# Patient Record
Sex: FEMALE | Race: BLACK OR AFRICAN AMERICAN | NOT HISPANIC OR LATINO | Employment: FULL TIME | URBAN - METROPOLITAN AREA
[De-identification: names, ages, dates, MRNs, and addresses within clinical notes are randomized per-mention and may not be internally consistent; named-entity substitution may affect disease eponyms.]

---

## 2023-07-01 ENCOUNTER — OFFICE VISIT (OUTPATIENT)
Dept: URGENT CARE | Facility: CLINIC | Age: 33
End: 2023-07-01
Payer: COMMERCIAL

## 2023-07-01 VITALS
SYSTOLIC BLOOD PRESSURE: 138 MMHG | HEART RATE: 84 BPM | RESPIRATION RATE: 20 BRPM | HEIGHT: 63 IN | TEMPERATURE: 98 F | OXYGEN SATURATION: 98 % | WEIGHT: 228 LBS | DIASTOLIC BLOOD PRESSURE: 80 MMHG | BODY MASS INDEX: 40.4 KG/M2

## 2023-07-01 DIAGNOSIS — M62.830 BACK MUSCLE SPASM: ICD-10-CM

## 2023-07-01 DIAGNOSIS — M54.50 ACUTE BILATERAL LOW BACK PAIN WITHOUT SCIATICA: Primary | ICD-10-CM

## 2023-07-01 LAB
B-HCG UR QL: NEGATIVE
BILIRUB UR QL STRIP: NEGATIVE
CTP QC/QA: YES
GLUCOSE UR QL STRIP: NEGATIVE
KETONES UR QL STRIP: NEGATIVE
LEUKOCYTE ESTERASE UR QL STRIP: NEGATIVE
PH, POC UA: 5
POC BLOOD, URINE: NEGATIVE
POC NITRATES, URINE: NEGATIVE
PROT UR QL STRIP: NEGATIVE
SP GR UR STRIP: 1.02 (ref 1–1.03)
UROBILINOGEN UR STRIP-ACNC: NORMAL (ref 0.1–1.1)

## 2023-07-01 PROCEDURE — 96372 THER/PROPH/DIAG INJ SC/IM: CPT | Mod: S$GLB,,,

## 2023-07-01 PROCEDURE — 81025 URINE PREGNANCY TEST: CPT | Mod: S$GLB,,,

## 2023-07-01 PROCEDURE — 99203 PR OFFICE/OUTPT VISIT, NEW, LEVL III, 30-44 MIN: ICD-10-PCS | Mod: 25,S$GLB,,

## 2023-07-01 PROCEDURE — 99203 OFFICE O/P NEW LOW 30 MIN: CPT | Mod: 25,S$GLB,,

## 2023-07-01 PROCEDURE — 81003 URINALYSIS AUTO W/O SCOPE: CPT | Mod: QW,S$GLB,,

## 2023-07-01 PROCEDURE — 96372 PR INJECTION,THERAP/PROPH/DIAG2ST, IM OR SUBCUT: ICD-10-PCS | Mod: S$GLB,,,

## 2023-07-01 PROCEDURE — 81025 POCT URINE PREGNANCY: ICD-10-PCS | Mod: S$GLB,,,

## 2023-07-01 PROCEDURE — 81003 POCT URINALYSIS, DIPSTICK, AUTOMATED, W/O SCOPE: ICD-10-PCS | Mod: QW,S$GLB,,

## 2023-07-01 RX ORDER — DEXAMETHASONE SODIUM PHOSPHATE 100 MG/10ML
10 INJECTION INTRAMUSCULAR; INTRAVENOUS
Status: COMPLETED | OUTPATIENT
Start: 2023-07-01 | End: 2023-07-01

## 2023-07-01 RX ORDER — KETOROLAC TROMETHAMINE 10 MG/1
10 TABLET, FILM COATED ORAL EVERY 6 HOURS PRN
Qty: 20 TABLET | Refills: 0 | Status: SHIPPED | OUTPATIENT
Start: 2023-07-01 | End: 2023-07-06

## 2023-07-01 RX ORDER — PREDNISONE 10 MG/1
TABLET ORAL
Qty: 8 TABLET | Refills: 0 | Status: SHIPPED | OUTPATIENT
Start: 2023-07-01

## 2023-07-01 RX ORDER — KETOROLAC TROMETHAMINE 30 MG/ML
60 INJECTION, SOLUTION INTRAMUSCULAR; INTRAVENOUS
Status: COMPLETED | OUTPATIENT
Start: 2023-07-01 | End: 2023-07-01

## 2023-07-01 RX ADMIN — DEXAMETHASONE SODIUM PHOSPHATE 10 MG: 100 INJECTION INTRAMUSCULAR; INTRAVENOUS at 11:07

## 2023-07-01 RX ADMIN — KETOROLAC TROMETHAMINE 60 MG: 30 INJECTION, SOLUTION INTRAMUSCULAR; INTRAVENOUS at 12:07

## 2023-07-01 NOTE — LETTER
July 1, 2023      [place] [facility]  [facility], [address]  [place] [address]  Phone: [phone]  Fax: [phone]       Patient: Myra Marie   YOB: 1990  Date of Visit: 07/01/2023    To Whom It May Concern:    Stefano Marie  was at [facility] on 07/01/2023. The patient may return to work/school on 07/02/2023 with no restrictions. If you have any questions or concerns, or if I can be of further assistance, please do not hesitate to contact me.    Sincerely,    Lynda Patel, NP

## 2023-07-01 NOTE — PROGRESS NOTES
"Subjective:       Patient ID: Myra Marie is a 33 y.o. female.    Vitals:  height is 5' 3" (1.6 m) and weight is 103.4 kg (228 lb). Her oral temperature is 98.4 °F (36.9 °C). Her blood pressure is 138/80 and her pulse is 84. Her respiration is 20 and oxygen saturation is 98%.     Chief Complaint: Back Pain (Low back pain x 2 days worse on the left.  Denies any urinary urgency, frequency or burning with urination.  Denies fever.)    This is a 33 y.o. female who presents today with a chief complaint of Low back pain x 2 days worse on the left.  Denies any urinary urgency, frequency or burning with urination.  Denies fever.  Patient presents with:  Back Pain: Low back pain x 2 days worse on the left.  Denies any urinary urgency, frequency or burning with urination.  Denies fever.  Patient states that the pain is in her left lower back area patient states that the pain radiates across her entire lower back patient states that the pain is a sharp pain. Patient states that the pain gets worse with certain movements. Patient states that she does stay on her feet a lot at work. Patient denies any trauma or known cause.          Back Pain  This is a new problem. The current episode started yesterday. The problem has been gradually worsening since onset. The quality of the pain is described as aching. The pain does not radiate. The pain is at a severity of 8/10. The pain is severe. The symptoms are aggravated by twisting and sitting (walking). She has tried nothing for the symptoms. The treatment provided no relief.     Constitution: Negative.   HENT: Negative.     Neck: neck negative.   Cardiovascular: Negative.    Eyes: Negative.    Respiratory: Negative.     Gastrointestinal: Negative.    Endocrine: negative.   Genitourinary:  Positive for flank pain.   Musculoskeletal:  Positive for back pain.   Skin: Negative.    Allergic/Immunologic: Negative.    Neurological: Negative.    Hematologic/Lymphatic: Negative.  "   Psychiatric/Behavioral: Negative.           Objective:      Physical Exam   Constitutional: She is oriented to person, place, and time.   HENT:   Head: Normocephalic and atraumatic.   Eyes: Conjunctivae are normal. Pupils are equal, round, and reactive to light. Extraocular movement intact   Cardiovascular: Normal rate and normal pulses.   Pulmonary/Chest: Effort normal.   Abdominal: Normal appearance.   Musculoskeletal:         General: Tenderness present.      Thoracic back: Normal.      Lumbar back: She exhibits decreased range of motion, tenderness and spasm.   Neurological: no focal deficit. She is alert, oriented to person, place, and time and at baseline.   Skin: Skin is warm. Capillary refill takes less than 2 seconds.   Psychiatric: Her behavior is normal. Mood, judgment and thought content normal.   Nursing note and vitals reviewed.      Past medical history and current medications reviewed.       Assessment:     Results for orders placed or performed in visit on 07/01/23   POCT urine pregnancy   Result Value Ref Range    POC Preg Test, Ur Negative Negative     Acceptable Yes    POCT Urinalysis, Dipstick, Automated, W/O Scope   Result Value Ref Range    POC Blood, Urine Negative Negative    POC Bilirubin, Urine Negative Negative    POC Urobilinogen, Urine Normal 0.1 - 1.1    POC Ketones, Urine Negative Negative    POC Protein, Urine Negative Negative    POC Nitrates, Urine Negative Negative    POC Glucose, Urine Negative Negative    pH, UA 5.0     POC Specific Gravity, Urine 1.025 1.003 - 1.029    POC Leukocytes, Urine Negative Negative             1. Acute bilateral low back pain without sciatica    2. Back muscle spasm              Plan:         Acute bilateral low back pain without sciatica  -     POCT urine pregnancy  -     POCT Urinalysis, Dipstick, Automated, W/O Scope  -     predniSONE (DELTASONE) 10 MG tablet; Take 20 mg on day one then take 10 mg on day 2-7  Dispense: 8 tablet;  Refill: 0  -     ketorolac (TORADOL) 10 mg tablet; Take 1 tablet (10 mg total) by mouth every 6 (six) hours as needed for Pain.  Dispense: 20 tablet; Refill: 0  -     ketorolac injection 60 mg  -     dexAMETHasone injection 10 mg    Back muscle spasm             Patient Instructions   Patient educated to start prednisone tomorrow  Do not take Ketorolac until 6 hours due to receiving shot in office.       You must understand that you've received an Urgent Care treatment only and that you may be released before all your medical problems are known or treated. You, the patient, will arrange for follow up care as instructed.  Follow up with your PCP or specialty clinic as directed in the next 1-2 weeks if not improved or as needed.  You can call [phone] to schedule an appointment with the appropriate provider.  If your condition worsens we recommend that you receive another evaluation at the emergency room immediately or contact your primary medical clinics after hours call service to discuss your concerns.  Please return here or go to the Emergency Department for any concerns or worsening of condition.  Please if you smoke please consider quitting. [facility] Smoke cessation hotline number is [phone], available at this number is free counseling and medications to live a healthier life!         If you were prescribed a narcotic or controlled medication, do not drive or operate heavy equipment or machinery while taking these medications.

## 2023-07-01 NOTE — PATIENT INSTRUCTIONS
Patient educated to start prednisone tomorrow  Do not take Ketorolac until 6 hours due to receiving shot in office.       You must understand that you've received an Urgent Care treatment only and that you may be released before all your medical problems are known or treated. You, the patient, will arrange for follow up care as instructed.  Follow up with your PCP or specialty clinic as directed in the next 1-2 weeks if not improved or as needed.  You can call [phone] to schedule an appointment with the appropriate provider.  If your condition worsens we recommend that you receive another evaluation at the emergency room immediately or contact your primary medical clinics after hours call service to discuss your concerns.  Please return here or go to the Emergency Department for any concerns or worsening of condition.  Please if you smoke please consider quitting. [facility] Smoke cessation hotline number is [phone], available at this number is free counseling and medications to live a healthier life!         If you were prescribed a narcotic or controlled medication, do not drive or operate heavy equipment or machinery while taking these medications.

## 2023-07-05 ENCOUNTER — HOSPITAL ENCOUNTER (EMERGENCY)
Facility: HOSPITAL | Age: 33
Discharge: HOME | End: 2023-07-05
Attending: EMERGENCY MEDICINE
Payer: COMMERCIAL

## 2023-07-05 VITALS
WEIGHT: 228 LBS | HEIGHT: 63 IN | BODY MASS INDEX: 40.4 KG/M2 | OXYGEN SATURATION: 99 % | DIASTOLIC BLOOD PRESSURE: 89 MMHG | SYSTOLIC BLOOD PRESSURE: 156 MMHG | TEMPERATURE: 98 F | RESPIRATION RATE: 18 BRPM | HEART RATE: 74 BPM

## 2023-07-05 DIAGNOSIS — M62.830 BACK MUSCLE SPASM: Primary | ICD-10-CM

## 2023-07-05 LAB
ALBUMIN SERPL BCP-MCNC: 3.4 G/DL (ref 3.5–5.2)
ALP SERPL-CCNC: 51 U/L (ref 55–135)
ALT SERPL W/O P-5'-P-CCNC: 13 U/L (ref 10–44)
ANION GAP SERPL CALC-SCNC: 11 MMOL/L (ref 8–16)
AST SERPL-CCNC: 12 U/L (ref 10–40)
B-HCG UR QL: NEGATIVE
BASOPHILS # BLD AUTO: 0.04 K/UL (ref 0–0.2)
BASOPHILS NFR BLD: 0.3 % (ref 0–1.9)
BILIRUB SERPL-MCNC: 0.2 MG/DL (ref 0.1–1)
BILIRUB UR QL STRIP: NEGATIVE
BUN SERPL-MCNC: 14 MG/DL (ref 6–20)
CALCIUM SERPL-MCNC: 8.9 MG/DL (ref 8.7–10.5)
CHLORIDE SERPL-SCNC: 104 MMOL/L (ref 95–110)
CLARITY UR: CLEAR
CO2 SERPL-SCNC: 23 MMOL/L (ref 23–29)
COLOR UR: YELLOW
CREAT SERPL-MCNC: 0.9 MG/DL (ref 0.5–1.4)
CRP SERPL-MCNC: 18.9 MG/L (ref 0–8.2)
DIFFERENTIAL METHOD: ABNORMAL
EOSINOPHIL # BLD AUTO: 0.1 K/UL (ref 0–0.5)
EOSINOPHIL NFR BLD: 0.6 % (ref 0–8)
ERYTHROCYTE [DISTWIDTH] IN BLOOD BY AUTOMATED COUNT: 17.7 % (ref 11.5–14.5)
EST. GFR  (NO RACE VARIABLE): >60 ML/MIN/1.73 M^2
GLUCOSE SERPL-MCNC: 82 MG/DL (ref 70–110)
GLUCOSE UR QL STRIP: NEGATIVE
HCT VFR BLD AUTO: 29.6 % (ref 37–48.5)
HGB BLD-MCNC: 9.4 G/DL (ref 12–16)
HGB UR QL STRIP: NEGATIVE
IMM GRANULOCYTES # BLD AUTO: 0.05 K/UL (ref 0–0.04)
IMM GRANULOCYTES NFR BLD AUTO: 0.4 % (ref 0–0.5)
KETONES UR QL STRIP: NEGATIVE
LEUKOCYTE ESTERASE UR QL STRIP: NEGATIVE
LYMPHOCYTES # BLD AUTO: 3.9 K/UL (ref 1–4.8)
LYMPHOCYTES NFR BLD: 33 % (ref 18–48)
MCH RBC QN AUTO: 23.4 PG (ref 27–31)
MCHC RBC AUTO-ENTMCNC: 31.8 G/DL (ref 32–36)
MCV RBC AUTO: 74 FL (ref 82–98)
MONOCYTES # BLD AUTO: 0.9 K/UL (ref 0.3–1)
MONOCYTES NFR BLD: 7.3 % (ref 4–15)
NEUTROPHILS # BLD AUTO: 7 K/UL (ref 1.8–7.7)
NEUTROPHILS NFR BLD: 58.4 % (ref 38–73)
NITRITE UR QL STRIP: NEGATIVE
NRBC BLD-RTO: 0 /100 WBC
PH UR STRIP: 6 [PH] (ref 5–8)
PLATELET # BLD AUTO: 345 K/UL (ref 150–450)
PMV BLD AUTO: 9.1 FL (ref 9.2–12.9)
POTASSIUM SERPL-SCNC: 3.6 MMOL/L (ref 3.5–5.1)
PROT SERPL-MCNC: 6.8 G/DL (ref 6–8.4)
PROT UR QL STRIP: NEGATIVE
RBC # BLD AUTO: 4.01 M/UL (ref 4–5.4)
SODIUM SERPL-SCNC: 138 MMOL/L (ref 136–145)
SP GR UR STRIP: 1.02 (ref 1–1.03)
URN SPEC COLLECT METH UR: NORMAL
UROBILINOGEN UR STRIP-ACNC: NEGATIVE EU/DL
WBC # BLD AUTO: 11.95 K/UL (ref 3.9–12.7)

## 2023-07-05 PROCEDURE — 96360 HYDRATION IV INFUSION INIT: CPT

## 2023-07-05 PROCEDURE — 81003 URINALYSIS AUTO W/O SCOPE: CPT | Performed by: EMERGENCY MEDICINE

## 2023-07-05 PROCEDURE — 99285 EMERGENCY DEPT VISIT HI MDM: CPT | Mod: 25

## 2023-07-05 PROCEDURE — 63600175 PHARM REV CODE 636 W HCPCS: Performed by: EMERGENCY MEDICINE

## 2023-07-05 PROCEDURE — 86140 C-REACTIVE PROTEIN: CPT | Performed by: EMERGENCY MEDICINE

## 2023-07-05 PROCEDURE — 74176 CT ABD & PELVIS W/O CONTRAST: CPT | Mod: TC

## 2023-07-05 PROCEDURE — 80053 COMPREHEN METABOLIC PANEL: CPT | Performed by: EMERGENCY MEDICINE

## 2023-07-05 PROCEDURE — 74176 CT ABDOMEN PELVIS WITHOUT CONTRAST: ICD-10-PCS | Mod: 26,,, | Performed by: RADIOLOGY

## 2023-07-05 PROCEDURE — 96372 THER/PROPH/DIAG INJ SC/IM: CPT | Mod: 59 | Performed by: EMERGENCY MEDICINE

## 2023-07-05 PROCEDURE — 25000003 PHARM REV CODE 250: Performed by: EMERGENCY MEDICINE

## 2023-07-05 PROCEDURE — 74176 CT ABD & PELVIS W/O CONTRAST: CPT | Mod: 26,,, | Performed by: RADIOLOGY

## 2023-07-05 PROCEDURE — 81025 URINE PREGNANCY TEST: CPT | Performed by: EMERGENCY MEDICINE

## 2023-07-05 PROCEDURE — 85025 COMPLETE CBC W/AUTO DIFF WBC: CPT | Performed by: EMERGENCY MEDICINE

## 2023-07-05 RX ORDER — HYDROCODONE BITARTRATE AND ACETAMINOPHEN 10; 325 MG/1; MG/1
1 TABLET ORAL EVERY 6 HOURS PRN
Qty: 20 TABLET | Refills: 0 | Status: SHIPPED | OUTPATIENT
Start: 2023-07-05

## 2023-07-05 RX ORDER — LIDOCAINE 50 MG/G
1 PATCH TOPICAL DAILY PRN
Qty: 10 PATCH | Refills: 0 | Status: SHIPPED | OUTPATIENT
Start: 2023-07-05

## 2023-07-05 RX ORDER — ONDANSETRON 4 MG/1
4 TABLET, ORALLY DISINTEGRATING ORAL
Status: COMPLETED | OUTPATIENT
Start: 2023-07-05 | End: 2023-07-05

## 2023-07-05 RX ORDER — HYDROMORPHONE HYDROCHLORIDE 1 MG/ML
1 INJECTION, SOLUTION INTRAMUSCULAR; INTRAVENOUS; SUBCUTANEOUS
Status: COMPLETED | OUTPATIENT
Start: 2023-07-05 | End: 2023-07-05

## 2023-07-05 RX ORDER — KETOROLAC TROMETHAMINE 30 MG/ML
60 INJECTION, SOLUTION INTRAMUSCULAR; INTRAVENOUS
Status: COMPLETED | OUTPATIENT
Start: 2023-07-05 | End: 2023-07-05

## 2023-07-05 RX ADMIN — ONDANSETRON 4 MG: 4 TABLET, ORALLY DISINTEGRATING ORAL at 08:07

## 2023-07-05 RX ADMIN — HYDROMORPHONE HYDROCHLORIDE 1 MG: 1 INJECTION, SOLUTION INTRAMUSCULAR; INTRAVENOUS; SUBCUTANEOUS at 08:07

## 2023-07-05 RX ADMIN — KETOROLAC TROMETHAMINE 60 MG: 30 INJECTION, SOLUTION INTRAMUSCULAR; INTRAVENOUS at 08:07

## 2023-07-05 RX ADMIN — SODIUM CHLORIDE 1000 ML: 9 INJECTION, SOLUTION INTRAVENOUS at 08:07

## 2023-07-05 NOTE — Clinical Note
"Myra"Autumn Marie was seen and treated in our emergency department on 7/5/2023.  She may return to work on 07/07/2023.       If you have any questions or concerns, please don't hesitate to call.      FAUSTINO Villar RN    "

## 2023-07-06 ENCOUNTER — TELEPHONE (OUTPATIENT)
Dept: ORTHOPEDICS | Facility: CLINIC | Age: 33
End: 2023-07-06
Payer: COMMERCIAL

## 2023-07-06 NOTE — ED PROVIDER NOTES
Encounter Date: 7/5/2023       History     Chief Complaint   Patient presents with    Flank Pain     Left sided since Tuesday of last week.      Pt here with left flank pain the past 3days, now worse. Pain rads across her lower back this up toward her scapula. No abd pain. No hx of kidney stones. Pain 10/10. No dysuria or hematuria. She says pain gets severe with movement. Seen at  last week and dx with muscle spasms and given steroids and pain meds but pain still happening.     The history is provided by the patient.   Back Pain   This is a new problem. The problem occurs intermittently. The problem has been unchanged. The pain is associated with no known injury. The pain is present in the thoracic spine and lumbar spine. The quality of the pain is described as stabbing and shooting. The pain is at a severity of 9/10. The symptoms are aggravated by bending and twisting. Pertinent negatives include no chest pain, no fever, no numbness, no weight loss, no headaches, no abdominal pain, no abdominal swelling, no bowel incontinence, no perianal numbness, no bladder incontinence, no dysuria, no pelvic pain, no leg pain, no paresthesias, no paresis, no tingling and no weakness. She has tried analgesics for the symptoms. The treatment provided mild relief.   Review of patient's allergies indicates:  No Known Allergies  History reviewed. No pertinent past medical history.  History reviewed. No pertinent surgical history.  History reviewed. No pertinent family history.  Social History     Tobacco Use    Smoking status: Never     Passive exposure: Never    Smokeless tobacco: Never     Review of Systems   Constitutional:  Negative for fever and weight loss.   Cardiovascular:  Negative for chest pain.   Gastrointestinal:  Positive for nausea. Negative for abdominal pain and bowel incontinence.   Genitourinary:  Positive for flank pain. Negative for bladder incontinence, dysuria and pelvic pain.   Musculoskeletal:  Positive  for back pain.   Neurological:  Negative for tingling, weakness, numbness, headaches and paresthesias.   All other systems reviewed and are negative.    Physical Exam     Initial Vitals [07/05/23 1945]   BP Pulse Resp Temp SpO2   (!) 156/89 74 18 98 °F (36.7 °C) 99 %      MAP       --         Physical Exam    Nursing note and vitals reviewed.  Constitutional: She appears well-developed and well-nourished. She is not diaphoretic. She appears distressed.   Mod-severe distress 2/2 pain   HENT:   Mouth/Throat: Oropharynx is clear and moist.   Eyes: No scleral icterus.   Neck: Neck supple.   Normal range of motion.  Cardiovascular:  Normal rate, regular rhythm, normal heart sounds and intact distal pulses.           Pulmonary/Chest: Breath sounds normal.   Abdominal: Abdomen is soft. She exhibits no distension. There is no abdominal tenderness.   Ttp to left paraspinous muscle Lspine. No CVAT. No midline ttp. No rash or bruising.   Musculoskeletal:         General: No tenderness or edema. Normal range of motion.      Cervical back: Normal range of motion and neck supple.      Comments: Neg SLR micheal     Neurological: She is alert and oriented to person, place, and time. She has normal strength. She displays normal reflexes. No cranial nerve deficit or sensory deficit. GCS score is 15. GCS eye subscore is 4. GCS verbal subscore is 5. GCS motor subscore is 6.   Skin: Skin is warm and dry. Capillary refill takes less than 2 seconds. No rash noted. No erythema. No pallor.   Psychiatric:   Anxious 2/2 pain       ED Course   Procedures  Labs Reviewed   CBC W/ AUTO DIFFERENTIAL - Abnormal; Notable for the following components:       Result Value    Hemoglobin 9.4 (*)     Hematocrit 29.6 (*)     MCV 74 (*)     MCH 23.4 (*)     MCHC 31.8 (*)     RDW 17.7 (*)     MPV 9.1 (*)     Immature Grans (Abs) 0.05 (*)     All other components within normal limits   COMPREHENSIVE METABOLIC PANEL - Abnormal; Notable for the following  components:    Albumin 3.4 (*)     Alkaline Phosphatase 51 (*)     All other components within normal limits   C-REACTIVE PROTEIN - Abnormal; Notable for the following components:    CRP 18.9 (*)     All other components within normal limits   URINALYSIS, REFLEX TO URINE CULTURE    Narrative:     Preferred Collection Type->Urine, Clean Catch  Specimen Source->Urine   PREGNANCY TEST, URINE RAPID    Narrative:     Specimen Source->Urine          Imaging Results              CT Abdomen Pelvis  Without Contrast (Final result)  Result time 07/05/23 20:35:57      Final result by Bekah Isbell MD (07/05/23 20:35:57)                   Impression:      Circumferential bladder wall thickening.  Correlate for cystitis.      Electronically signed by: Bekah Isbell  Date:    07/05/2023  Time:    20:35               Narrative:    EXAMINATION:  CT ABDOMEN PELVIS WITHOUT CONTRAST    CLINICAL HISTORY:  Flank pain, kidney stone suspected;    TECHNIQUE:  Low dose axial images, sagittal and coronal reformations were obtained from the lung bases to the pubic symphysis, Oral contrast was not administered.    COMPARISON:  None    FINDINGS:  Heart: Normal in size. No pericardial effusion.    Lung Bases: Well aerated, without consolidation or pleural fluid.    Liver: Normal in size and attenuation, with no focal hepatic lesions.    Gallbladder: Suspect cholelithiasis.    Bile Ducts: No evidence of dilated ducts.    Pancreas: No mass or peripancreatic fat stranding.    Spleen: Unremarkable.    Adrenals: Unremarkable.    Kidneys/ Ureters: Unremarkable.  No renal stones, ureteral stones, hydronephrosis, or hydroureter.    Bladder: Circumferential bladder wall thickening.    Reproductive organs: Unremarkable.    GI Tract/Mesentery: No evidence of bowel obstruction or inflammation.    Peritoneal Space: No ascites. No free air.    Retroperitoneum: No significant adenopathy.    Abdominal wall: Unremarkable.    Vasculature: No  significant atherosclerosis or aneurysm.    Bones: No acute fracture.                                       Medications   sodium chloride 0.9% bolus 1,000 mL 1,000 mL (1,000 mLs Intravenous New Bag 7/5/23 2056)   ketorolac injection 60 mg (60 mg Intramuscular Given 7/5/23 2002)   ondansetron disintegrating tablet 4 mg (4 mg Oral Given 7/5/23 2002)   HYDROmorphone injection 1 mg (1 mg Intramuscular Given 7/5/23 2002)     Medical Decision Making:   Differential Diagnosis:   Renal colic, muscle strain, back sprain, UTI, shingles  Clinical Tests:   Lab Tests: Ordered and Reviewed  Radiological Study: Ordered and Reviewed  ED Management:  Pt presented with back pain/flank pain. Neg UA and UPT. CT abd neg per rads. CBC with mild chronic anemia. CMP unremarkable. CRP slightly elevated. Pain treated with norco and toradol. I suspect MSK back strain. Will Rx lidocaine patches and norco. Will send ortho referral as well.                         Clinical Impression:   Final diagnoses:  [M62.830] Back muscle spasm (Primary)        ED Disposition Condition    Discharge Stable          ED Prescriptions       Medication Sig Dispense Start Date End Date Auth. Provider    HYDROcodone-acetaminophen (NORCO)  mg per tablet Take 1 tablet by mouth every 6 (six) hours as needed for Pain. 20 tablet 7/5/2023 -- Hardeep Jaramillo Jr., MD    LIDOcaine (LIDODERM) 5 % Place 1 patch onto the skin daily as needed (pain). 10 patch 7/5/2023 -- Hardeep Jaramillo Jr., MD          Follow-up Information       Follow up With Specialties Details Why Contact Info    Steven Florence MD Orthopedic Surgery Schedule an appointment as soon as possible for a visit   [address] [facility] [place] [address] [phone]               Hardeep Jaramillo Jr., MD  07/05/23 5604

## 2023-07-06 NOTE — ED NOTES
Pt here for lower back pain that pt states radiates from left lower back across to the right side of back and up back.  Pt describes pain as a stabbing type pain.  Pt states this has been going on for about a week and severity of pain comes and goes.   Pt medicated per MAR.  Pt voices no other concerns at this time.

## 2023-07-07 DIAGNOSIS — M54.9 BACK PAIN, UNSPECIFIED BACK LOCATION, UNSPECIFIED BACK PAIN LATERALITY, UNSPECIFIED CHRONICITY: Primary | ICD-10-CM

## 2023-07-10 ENCOUNTER — HOSPITAL ENCOUNTER (OUTPATIENT)
Dept: RADIOLOGY | Facility: HOSPITAL | Age: 33
Discharge: HOME | End: 2023-07-10
Attending: ORTHOPAEDIC SURGERY
Payer: COMMERCIAL

## 2023-07-10 ENCOUNTER — OFFICE VISIT (OUTPATIENT)
Dept: ORTHOPEDICS | Facility: CLINIC | Age: 33
End: 2023-07-10
Payer: COMMERCIAL

## 2023-07-10 ENCOUNTER — TELEPHONE (OUTPATIENT)
Dept: ORTHOPEDICS | Facility: CLINIC | Age: 33
End: 2023-07-10

## 2023-07-10 DIAGNOSIS — M62.830 BACK MUSCLE SPASM: ICD-10-CM

## 2023-07-10 DIAGNOSIS — M54.9 DORSALGIA, UNSPECIFIED: Primary | ICD-10-CM

## 2023-07-10 DIAGNOSIS — M54.9 BACK PAIN, UNSPECIFIED BACK LOCATION, UNSPECIFIED BACK PAIN LATERALITY, UNSPECIFIED CHRONICITY: ICD-10-CM

## 2023-07-10 PROCEDURE — 99204 PR OFFICE/OUTPT VISIT, NEW, LEVL IV, 45-59 MIN: ICD-10-PCS | Mod: S$GLB,,, | Performed by: ORTHOPAEDIC SURGERY

## 2023-07-10 PROCEDURE — 99999 PR PBB SHADOW E&M-EST. PATIENT-LVL III: ICD-10-PCS | Mod: PBBFAC,,, | Performed by: ORTHOPAEDIC SURGERY

## 2023-07-10 PROCEDURE — 99204 OFFICE O/P NEW MOD 45 MIN: CPT | Mod: S$GLB,,, | Performed by: ORTHOPAEDIC SURGERY

## 2023-07-10 PROCEDURE — 72100 XR LUMBAR SPINE AP AND LATERAL: ICD-10-PCS | Mod: 26,,, | Performed by: RADIOLOGY

## 2023-07-10 PROCEDURE — 72070 X-RAY EXAM THORAC SPINE 2VWS: CPT | Mod: TC,PN

## 2023-07-10 PROCEDURE — 99999 PR PBB SHADOW E&M-EST. PATIENT-LVL III: CPT | Mod: PBBFAC,,, | Performed by: ORTHOPAEDIC SURGERY

## 2023-07-10 PROCEDURE — 72100 X-RAY EXAM L-S SPINE 2/3 VWS: CPT | Mod: TC,PN

## 2023-07-10 PROCEDURE — 72100 X-RAY EXAM L-S SPINE 2/3 VWS: CPT | Mod: 26,,, | Performed by: RADIOLOGY

## 2023-07-10 PROCEDURE — 72070 XR THORACIC SPINE AP LATERAL: ICD-10-PCS | Mod: 26,,, | Performed by: RADIOLOGY

## 2023-07-10 PROCEDURE — 72070 X-RAY EXAM THORAC SPINE 2VWS: CPT | Mod: 26,,, | Performed by: RADIOLOGY

## 2023-07-10 RX ORDER — CELECOXIB 100 MG/1
100 CAPSULE ORAL 2 TIMES DAILY
Qty: 60 CAPSULE | Refills: 1 | Status: SHIPPED | OUTPATIENT
Start: 2023-07-10

## 2023-07-10 NOTE — PROGRESS NOTES
Subjective:      Patient ID: Myra Marie is a 33 y.o. female.    Chief Complaint: Pain of the Spine    HPI  33-year-old female with a 1-2 week history of low back pain.  Recalls no particular trauma.  She states that her back began to hurt it felt similar to previous a kidney infection she was riding some rides at the [employer] the seems compartment progressive pain was seen in the emergency department told that she probably did not have a kidney infection CT was obtained she was given pain medications muscle relaxants and some steroids by her PCP without much improvement.  She is complaining primarily of left-sided de to mid back pain.  Radiates into her buttocks and lower abdomen.  ROS      Objective:    Ortho Exam     Constitutional:   Patient is alert  and oriented in no acute distress  HEENT:  normocephalic atraumatic; PERRL EOMI  Neck:  Supple without adenopathy  Cardiovascular:  Normal rate and rhythm  Pulmonary:  Normal respiratory effort normal chest wall expansion  Abdominal:  Nonprotuberant nondistended  Musculoskeletal:   patient has no abnormality to inspection or palpation.  There is no scoliosis noted. Minimal tenderness over the low back.  Normal nonantalgic gait. Full and nonpainful bilateral hip range of motion. Minimal limitation of forward flexion.  Positive Juan signs noted to include pain with light touch  Motor strength is 5/5 all muscle groups tested.  Deep tendon reflexes were 2+ and symmetric. Toes downgoing with Babinski testing with no clonus  Neurological:  No focal defect; cranial nerves 2-12 grossly intact  Psychiatric/behavioral:  Mood and behavior normal    X-Ray Thoracic Spine AP Lateral  Narrative: EXAMINATION:  XR THORACIC SPINE AP LATERAL    CLINICAL HISTORY:  Dorsalgia, unspecified    TECHNIQUE:  AP and lateral views of the thoracic spine were performed.    COMPARISON:  None.    FINDINGS:  Thoracic vertebral body heights are preserved.  No bony destructive changes.   Minimal dextrocurvature of the lower thoracic spine.  Alignment is within normal limits. Intervertebral disc spaces are preserved.  Visualized lungs reveal no acute process.  Impression: As above.    Electronically signed by: Jean Marie Vazquez  Date:    07/10/2023  Time:    09:15  X-Ray Lumbar Spine AP And Lateral  Narrative: EXAMINATION:  XR LUMBAR SPINE AP AND LATERAL    CLINICAL HISTORY:  Dorsalgia, unspecified    TECHNIQUE:  AP, lateral and spot images were performed of the lumbar spine.    COMPARISON:  None    FINDINGS:  Mild broad levocurvature.  Sagittal alignment is within normal limits.  Vertebral body heights are preserved.  No fractures or bony destructive changes.  Mild disc height loss at L5-S1.  No substantial facet arthrosis on the basis of plain film radiography.  Impression: 1. Mild broad levocurvature as well as mild disc height loss suspected at L5-S1.  Otherwise negative radiographs of the lumbar spine.  No acute process.    Electronically signed by: Jean Marie Vazquez  Date:    07/10/2023  Time:    09:13       My Radiographs Findings:    I have personally reviewed radiographs and concur with above findings    Assessment:       Encounter Diagnoses   Name Primary?    Back muscle spasm     Dorsalgia, unspecified Yes         Plan:       Medical condition treatment options with her as it length.  What she describes as progressive pain of suggested MRI of her lumbar spine I have also suggested that she see her primary care doctor with some positive findings on the CT suspicious for cholelithiasis or possibly a cystitis.  I will give her some Celebrex.  GI cardiac and renal precautions were discussed.  I told her that she has progressive pain that she should seek urgent or emergent care while waiting to see if I can get the MRI accomplished.        History reviewed. No pertinent past medical history.  History reviewed. No pertinent surgical history.      Current Outpatient Medications:     celecoxib (CELEBREX) 100 MG  capsule, Take 1 capsule (100 mg total) by mouth 2 (two) times daily., Disp: 60 capsule, Rfl: 1    HYDROcodone-acetaminophen (NORCO)  mg per tablet, Take 1 tablet by mouth every 6 (six) hours as needed for Pain., Disp: 20 tablet, Rfl: 0    LIDOcaine (LIDODERM) 5 %, Place 1 patch onto the skin daily as needed (pain)., Disp: 10 patch, Rfl: 0    predniSONE (DELTASONE) 10 MG tablet, Take 20 mg on day one then take 10 mg on day 2-7, Disp: 8 tablet, Rfl: 0    Review of patient's allergies indicates:  No Known Allergies    History reviewed. No pertinent family history.  Social History     Occupational History    Not on file   Tobacco Use    Smoking status: Never     Passive exposure: Never    Smokeless tobacco: Never   Substance and Sexual Activity    Alcohol use: Not on file    Drug use: Not on file    Sexual activity: Not on file

## 2023-07-10 NOTE — TELEPHONE ENCOUNTER
----- Message from Jeyson Caldera sent at 7/10/2023 11:50 AM CDT -----  Type: Needs Medical Advice  Who Called:  Patient    Pharmacy name and phone #:    [id number] [place] [place] [facility] [address] [facility] [address]  [address] [place] [address]  [facility] [address]  Phone: [phone] Fax: [phone]      Best Call Back Number: [phone]  Additional Information: Patient states that her medication needs a PA:    celecoxib (CELEBREX) 100 MG capsule    Please call to advise

## 2023-07-11 ENCOUNTER — CLINICAL SUPPORT (OUTPATIENT)
Dept: REHABILITATION | Facility: HOSPITAL | Age: 33
End: 2023-07-11
Attending: ORTHOPAEDIC SURGERY
Payer: COMMERCIAL

## 2023-07-11 ENCOUNTER — HOSPITAL ENCOUNTER (OUTPATIENT)
Dept: RADIOLOGY | Facility: HOSPITAL | Age: 33
Discharge: HOME | End: 2023-07-11
Attending: ORTHOPAEDIC SURGERY
Payer: COMMERCIAL

## 2023-07-11 DIAGNOSIS — M62.830 BACK MUSCLE SPASM: ICD-10-CM

## 2023-07-11 DIAGNOSIS — M54.9 DORSALGIA, UNSPECIFIED: ICD-10-CM

## 2023-07-11 PROCEDURE — 72148 MRI LUMBAR SPINE W/O DYE: CPT | Mod: TC

## 2023-07-11 PROCEDURE — 97140 MANUAL THERAPY 1/> REGIONS: CPT | Mod: PN

## 2023-07-11 PROCEDURE — 97161 PT EVAL LOW COMPLEX 20 MIN: CPT | Mod: PN

## 2023-07-11 PROCEDURE — 72148 MRI LUMBAR SPINE WITHOUT CONTRAST: ICD-10-PCS | Mod: 26,,, | Performed by: RADIOLOGY

## 2023-07-11 PROCEDURE — 72148 MRI LUMBAR SPINE W/O DYE: CPT | Mod: 26,,, | Performed by: RADIOLOGY

## 2023-07-11 NOTE — PROGRESS NOTES
[facility]  Physical Therapy Initial Evaluation    Name: Myra Marie  Clinic Number: [id number]    Therapy Diagnosis:   Encounter Diagnoses   Name Primary?    Back muscle spasm     Dorsalgia, unspecified      Physician: Steven Florence,*    Physician Orders: PT Eval and Treat   Medical Diagnosis from Referral:  M62.830 (ICD-10-CM) - Back muscle spasm    M54.9 (ICD-10-CM) - Dorsalgia, unspecified  Evaluation Date: 7/11/2023  Authorization Period Expiration: 7/11/24  Plan of Care Expiration: 10/9/23  Visit # / Visits authorized: 1/ 1  FOTO Visit #:  1/3    Time In: 1:30 pm  Time Out: 2:30pm  Total Appointment Time (timed & untimed codes): 45 minutes    Precautions: Standard    Subjective     History of current condition -33-year-old female with a 1-2 week history of low back pain.  Recalls no particular trauma.  She states that her back began to hurt it felt similar to previous a kidney infection she was riding some rides at the [employer] the seems compartment progressive pain was seen in the emergency department told that she probably did not have a kidney infection CT was obtained she was given pain medications muscle relaxants and some steroids by her PCP without much improvement.  She is complaining primarily of left-sided low to mid back pain.  Radiates into her buttocks and lower abdomen.      Medical History:   No past medical history on file.    Surgical History:   Myra Marie  has no past surgical history on file.    Medications:   Myra has a current medication list which includes the following prescription(s): celecoxib, hydrocodone-acetaminophen, lidocaine, and prednisone.    Allergies:   Review of patient's allergies indicates:  No Known Allergies     Imaging, MRI has been ordered but not yet completed. Pt reports that she will go post this appointment.  EXAMINATION:  XR LUMBAR SPINE AP AND LATERAL  CLINICAL HISTORY:  Dorsalgia, unspecified  TECHNIQUE:  AP,  lateral and spot images were performed of the lumbar spine.  COMPARISON:  None  FINDINGS:  Mild broad levocurvature.  Sagittal alignment is within normal limits.  Vertebral body heights are preserved.  No fractures or bony destructive changes.  Mild disc height loss at L5-S1.  No substantial facet arthrosis on the basis of plain film radiography.     Impression:     1. Mild broad levocurvature as well as mild disc height loss suspected at L5-S1.  Otherwise negative radiographs of the lumbar spine.  No acute process.        Electronically signed by: Jean Marie Vazquez  Date:                                            07/10/2023  Time:                                           09:13      EXAMINATION:  XR THORACIC SPINE AP LATERAL     CLINICAL HISTORY:  Dorsalgia, unspecified     TECHNIQUE:  AP and lateral views of the thoracic spine were performed.     COMPARISON:  None.     FINDINGS:  Thoracic vertebral body heights are preserved.  No bony destructive changes.  Minimal dextrocurvature of the lower thoracic spine.  Alignment is within normal limits. Intervertebral disc spaces are preserved.  Visualized lungs reveal no acute process.     Impression:     As above.        Electronically signed by: Jean Marie Vazquez  Date:                                            07/10/2023  Time:                                           09:15  Prior Therapy: None  Social History: One story home lives with their spouse and 2 children. 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 Intermittent spasms.    Pain:  Current 7/10, worst 10/10, best 3/10 with pain pills  Location: left side low back pain however spreads to the right side with prolonged spasming   Description: Aching, Tight, and Sharp  Aggravating Factors: Sitting, Laying, Bending, Walking, Morning, and Getting out  "of bed/chair, heating pad  Easing Factors: pain medication- very lightly    Pts goals: "Get better so I can get back to work"    Objective     Observation: Decreased guerda and stiffened gait; Slight flexed posture    Posture:    -       No postural abnormalities identified  Lumbar Range of Motion:    % of Normal Range Pain   Flexion 40 +   Extension 5 +   Left Side Bending 2    Right Side Bending 2    Left rotation Unable     Right Rotation Unable       Lower Extremity Strength   Left Right   Knee extension: 5/5 5/5   Knee flexion: 5/5 5/5   Hip flexion: 3-/5 3+/5   Hip extension:  Unable to test Unable to test   Hip abduction: 3+/5 3+/5   Hip adduction: Unable to test   Unable to test     Ankle dorsiflexion: 5/5 5/5   Ankle plantarflexion: 5/5 5/5   Trunk flexion Unable to test    Back extensors Unable to test      Special Tests:    Repeated Flexion Unable to test   Repeated Extension Unable to test   Straight Leg Raise Unable to test     Slump Negative   Femoral nerve test Unable to test       DTR:   Left Right Comment   Patellar (L3-4) 2+ 2+    Achilles (S1) 2+ 2+        Joint Mobility:   Lumbar: CPA Unable to test  RPA Unable to test  LPA Unable to test    Thoracic: Unable to test      Palpation: significant tenderness to palpation at left paraspinals    Sensation: No deficits    PT Evaluation Completed: Yes  Discussed Plan of Care with patient: Yes       Limitation/Restriction for FOTO Lumbar Survey    Therapist reviewed FOTO scores for Myra Marie on 7/11/2023.   FOTO documents entered into [employer] - see Media section.    Limitation Score: 60%         TREATMENT   Treatment Time In: 2:15 pm  Treatment Time Out: 2:30 pm  Total Treatment time (time-based codes) separate from Evaluation: 15  minutes    Myra received the treatments listed below:    MANUAL THERAPY TECHNIQUES including Myofacial release were applied to lumbar paraspinal  for 15 minutes.  IASTM left and right lumbar/thoracic paraspinals- " Reduced intensity of spasms reported    Home Exercises and Patient Education Provided    Education provided:   - Goals/POC    Written Home Exercises Provided: yes.  Exercises were reviewed and Myra was able to demonstrate them prior to the end of the session.  Myra demonstrated good  understanding of the education provided.     See EMR under Media for exercises provided 7/11/2023.    Assessment   Myra is a 33 y.o. female referred to outpatient Physical Therapy with a medical diagnosis of M62.830 (ICD-10-CM) - Back muscle spasm    M54.9 (ICD-10-CM) - Dorsalgia, unspecified. Evaluation limited by significant pain. Pt presents with Low back and thoracic pain, decreased ROM, decreased activity tolerance, difficulty with ambulation.    Pt prognosis is Good.   Pt will benefit from skilled outpatient Physical Therapy to address the deficits stated above and in the chart below, provide pt/family education, and to maximize pt's level of independence.     Plan of care discussed with patient: Yes  Pt's spiritual, cultural and educational needs considered and patient is agreeable to the plan of care and goals as stated below:     Anticipated Barriers for therapy: Significant reports of pain    Medical Necessity is demonstrated by the following  History  Co-morbidities and personal factors that may impact the plan of care Co-morbidities:   None    Personal Factors:   no deficits     low   Examination  Body Structures and Functions, activity limitations and participation restrictions that may impact the plan of care Body Regions:   trunk    Body Systems:    ROM  strength  gait  transfers    Participation Restrictions:   None    Activity limitations:   Learning and applying knowledge  no deficits    General Tasks and Commands  no deficits    Communication  no deficits    Mobility  lifting and carrying objects  walking  driving (bike, car, motorcycle)    Self care  washing oneself (bathing, drying, washing hands)  caring  for body parts (brushing teeth, shaving, grooming)    Domestic Life  shopping  cooking  doing house work (cleaning house, washing dishes, laundry)    Interactions/Relationships  no deficits    Life Areas  no deficits    Community and Social Life  community life  recreation and leisure  Work         low   Clinical Presentation stable and uncomplicated low   Decision Making/ Complexity Score: low     Goals:  Short Term Goals: 3 weeks   Reduction in max level pain to no more than 4/10 to allow for improved activity tolerance    2. Compliant with HEP         Long Term Goals: 6 weeks   Reduction in max level pain to 2/10 to improve activity tolerance and overall functional mobility   Improvement in walking / Standing tolerance to 30 mins -45 mins  Improve Gross LE strength to 5/5  Lift 30-50 lbs without pain  FOTO improvement to>/= 30%   Independent with HEP for continued improvement in function   Improve trunk ROM to WNL      Plan   Plan of care Certification: 7/11/2023 to 10/09/23.    Outpatient Physical Therapy 2 times weekly for 6 weeks to include the following interventions: Aquatic Therapy, Cervical/Lumbar Traction, Electrical Stimulation prn, Manual Therapy, Moist Heat/ Ice, Neuromuscular Re-ed, Therapeutic Activities, and Therapeutic Exercise, dry needling and other modalities as appropriate.    Jing Lambert, PT

## 2023-07-12 NOTE — PROGRESS NOTES
PT/PTA met face to face to discuss pt's treatment plan and progress towards established goals. Pt will be seen by a physical therapist minimally every 6th visit or every 30 days.    Please see Plan of Care dated 7/12/23 for changes and updated goals.  -Check MRI results performed following arianne Lambert PT

## 2023-07-13 ENCOUNTER — OFFICE VISIT (OUTPATIENT)
Dept: ORTHOPEDICS | Facility: CLINIC | Age: 33
End: 2023-07-13
Payer: COMMERCIAL

## 2023-07-13 VITALS — HEIGHT: 63 IN | WEIGHT: 227.94 LBS | BODY MASS INDEX: 40.39 KG/M2

## 2023-07-13 DIAGNOSIS — M54.9 BACK PAIN, UNSPECIFIED BACK LOCATION, UNSPECIFIED BACK PAIN LATERALITY, UNSPECIFIED CHRONICITY: Primary | ICD-10-CM

## 2023-07-13 PROCEDURE — 99999 PR PBB SHADOW E&M-EST. PATIENT-LVL III: CPT | Mod: PBBFAC,,, | Performed by: ORTHOPAEDIC SURGERY

## 2023-07-13 PROCEDURE — 99999 PR PBB SHADOW E&M-EST. PATIENT-LVL III: ICD-10-PCS | Mod: PBBFAC,,, | Performed by: ORTHOPAEDIC SURGERY

## 2023-07-13 PROCEDURE — 99214 OFFICE O/P EST MOD 30 MIN: CPT | Mod: S$GLB,,, | Performed by: ORTHOPAEDIC SURGERY

## 2023-07-13 PROCEDURE — 99214 PR OFFICE/OUTPT VISIT, EST, LEVL IV, 30-39 MIN: ICD-10-PCS | Mod: S$GLB,,, | Performed by: ORTHOPAEDIC SURGERY

## 2023-07-13 NOTE — PROGRESS NOTES
Subjective:      Patient ID: Myra Marie is a 33 y.o. female.    Chief Complaint: Pain of the Lumbar Spine    HPI  33-year-old female follow-up on back pain.  It is slightly improved.  She has had her initial physical therapy evaluation she has been taking her anti-inflammatory medicines and muscle relaxers.  She has had her MRI obtained in for that evaluation.  ROS      Objective:    Ortho Exam     Constitutional:   Patient is alert  and oriented in no acute distress  HEENT:  normocephalic atraumatic; PERRL EOMI  Neck:  Supple without adenopathy  Cardiovascular:  Normal rate and rhythm  Pulmonary:  Normal respiratory effort normal chest wall expansion  Abdominal:  Nonprotuberant nondistended  Musculoskeletal:  More comfortable with her gait in general.   patient has no abnormality to inspection or palpation.  There is no scoliosis noted. Minimal tenderness over the low back.  Normal nonantalgic gait.  Still has mild limitation of back range of motion nonpainful bilateral hip range of motion. Minimal limitation of forward flexion.   Motor strength is 5/5 all muscle groups tested.  Deep tendon reflexes were 2+ and symmetric. Toes downgoing with Babinski testing with no clonus  Neurological:  No focal defect; cranial nerves 2-12 grossly intact  Psychiatric/behavioral:  Mood and behavior normal    MRI Lumbar Spine Without Contrast  Narrative: EXAMINATION:  MRI LUMBAR SPINE WITHOUT CONTRAST    CLINICAL HISTORY:  Low back pain, trauma; Muscle spasm of back    TECHNIQUE:  Multiplanar, multisequence MR images were acquired from the thoracolumbar junction to the sacrum without the administration of contrast.    COMPARISON:  None.    FINDINGS:  The marrow signal is within normal limits.  The conus lies at the L1 vertebral body level.  L1/2: There are no significant degenerative changes at this level.    L2/3: There is a posteriorly oriented disc protrusion resulting in mild narrowing of bilateral neural  foramina.    L3/4: There is a posteriorly oriented disc protrusion resulting in mild narrowing of bilateral neural foramina.    L4/5: There is a posteriorly oriented disc protrusion resulting in mild narrowing of bilateral pleural neural foramina.    L5/S1: There is a circumferential disc protrusion resulting in mild narrowing of bilateral neural foramina.    There is no evidence clumping of the nerve roots.  The adjacent soft tissues are unremarkable.  Impression: There are mild degenerative changes of the lumbar spine as described above.    Electronically signed by: Chastity Escalona MD  Date:    07/11/2023  Time:    16:33       My Radiographs Findings:    I have personally reviewed radiographs and concur with above findings    Assessment:       Encounter Diagnosis   Name Primary?    Back pain, unspecified back location, unspecified back pain laterality, unspecified chronicity Yes         Plan:       I have discussed medical condition treatment options with her at length.  Will try to get her into a of a pain management spine specialist for consideration for epidurals or other treatment recommendations.  I have discussed general physical therapy as prescribed NSAIDs slowly advancing activities cautiously with prolonged sitting.  I have recommended no strenuous lifting or stooping frequent stooping or bending.  I think she can return to work next week without precautions.  A follow-up with me can be as needed.        History reviewed. No pertinent past medical history.  History reviewed. No pertinent surgical history.      Current Outpatient Medications:     celecoxib (CELEBREX) 100 MG capsule, Take 1 capsule (100 mg total) by mouth 2 (two) times daily., Disp: 60 capsule, Rfl: 1    HYDROcodone-acetaminophen (NORCO)  mg per tablet, Take 1 tablet by mouth every 6 (six) hours as needed for Pain., Disp: 20 tablet, Rfl: 0    LIDOcaine (LIDODERM) 5 %, Place 1 patch onto the skin daily as needed (pain)., Disp: 10  patch, Rfl: 0    predniSONE (DELTASONE) 10 MG tablet, Take 20 mg on day one then take 10 mg on day 2-7, Disp: 8 tablet, Rfl: 0    Review of patient's allergies indicates:  No Known Allergies    History reviewed. No pertinent family history.  Social History     Occupational History    Not on file   Tobacco Use    Smoking status: Never     Passive exposure: Never    Smokeless tobacco: Never   Substance and Sexual Activity    Alcohol use: Not on file    Drug use: Not on file    Sexual activity: Not on file

## 2023-07-13 NOTE — LETTER
July 13, 2023      [facility]  [address]  [place] [address]  Phone: [phone]  Fax: [phone]       Patient: Myra Marie   YOB: 1990  Date of Visit: 07/13/2023    To Whom It May Concern:    Stefano Marie  was at [facility] on 07/13/2023. The patient may return to work on 7/13/23 with restrictions. Her restrictions are NO heavy lifting. NO bending or stooping. If you have any questions or concerns, or if I can be of further assistance, please do not hesitate to contact me.    Sincerely,      MD Janis Leo LPN

## 2023-07-18 ENCOUNTER — CLINICAL SUPPORT (OUTPATIENT)
Dept: REHABILITATION | Facility: HOSPITAL | Age: 33
End: 2023-07-18
Attending: ORTHOPAEDIC SURGERY
Payer: COMMERCIAL

## 2023-07-18 DIAGNOSIS — M54.50 ACUTE LEFT-SIDED LOW BACK PAIN WITHOUT SCIATICA: Primary | ICD-10-CM

## 2023-07-18 PROCEDURE — 97014 ELECTRIC STIMULATION THERAPY: CPT | Mod: PN,CQ

## 2023-07-18 PROCEDURE — 97110 THERAPEUTIC EXERCISES: CPT | Mod: PN,CQ

## 2023-07-18 NOTE — PROGRESS NOTES
[facility]   Physical Therapy Treatment Note      Name: Myra [facility] Number: [id number]    Therapy Diagnosis:   Encounter Diagnosis   Name Primary?    Acute midline low back pain without sciatica Yes     Physician: Steven Florence,*    Visit Date: 7/18/2023    Physician Orders: PT Eval and Treat   Medical Diagnosis from Referral:  M62.830 (ICD-10-CM) - Back muscle spasm    M54.9 (ICD-10-CM) - Dorsalgia, unspecified  Evaluation Date: 7/11/2023  Authorization Period Expiration: 7/11/24  Plan of Care Expiration: 10/9/23  Visit # / Visits authorized: 1 / 1  FOTO Visit #:  1/3     Time In: 1:25 PM  Time Out: 2:25 PM  Total Appointment Time (timed & untimed codes): 45 minutes     Precautions: Standard    PTA Visit #: 1/5       Subjective     Pt reports: I am not having any real pain, just discomfort.  She was not compliant with home exercise program as one has not been issued yet.   Response to previous treatment: N/A  Functional change: N/A    Pain: 1/10  Location: bilateral low back     Objective      Objective Measures updated at progress report unless specified.     Treatment     Myra received the treatments listed below:      therapeutic exercises to develop strength, endurance, mobility for 15 minutes including:  Tr Ab w/ RSB x 20  Pelvic Tilts x 10  Supine Knee Fallouts x 15  Supine Clams x 15  Ball Squeezes x 2 min  DARRIUS x 2 min    manual therapy techniques:  were applied to the:  for  minutes, including:      neuromuscular re-education activities to improve:  for  minutes. The following activities were included:      therapeutic activities to improve functional performance for   minutes, including:      gait training to improve functional mobility and safety for   minutes, including:      direct contact modalities after being cleared for contraindications:     supervised modalities after being cleared for contradictions: IFC Electrical Stimulation:  Myra received  IFC Electrical Stimulation for pain control applied to the lumbar region. Pt received stimulation at static scan at a frequency of  for 20 minutes. Myra tolderated treatment well without any adverse effects.      hot pack for 20 minutes to lumbar region.    cold pack for  minutes to .    Patient Education and Home Exercises       Education provided:       Written Home Exercises Provided: . Exercises were reviewed and Myra was able to demonstrate them prior to the end of the session.  Myra demonstrated good  understanding of the education provided. See EMR under Patient Instructions for exercises provided during therapy sessions    Assessment     Patient did ok with limited exercises; all activities are limited by pain. Waiting on appt with pain management for possible epidural.     Myra Is progressing well towards her goals.   Pt prognosis is Good.     Pt will continue to benefit from skilled outpatient physical therapy to address the deficits listed in the problem list box on initial evaluation, provide pt/family education and to maximize pt's level of independence in the home and community environment.     Pt's spiritual, cultural and educational needs considered and pt agreeable to plan of care and goals.     Anticipated barriers to physical therapy: Significant reports of pain.    Goals:   Short Term Goals: 3 weeks   Reduction in max level pain to no more than 4/10 to allow for improved activity tolerance    2. Compliant with HEP         Long Term Goals: 6 weeks   Reduction in max level pain to 2/10 to improve activity tolerance and overall functional mobility   Improvement in walking /        Standing tolerance to 30 mins -45 mins  Improve Gross LE strength to 5/5  Lift 30-50 lbs without pain  FOTO improvement to>/= 30%   Independent with HEP for continued improvement in function   Improve trunk ROM to WNL       Plan     Continue per POC and progress with strength/mobility exercises as patient  tolerates.    Jonathan Favre, PTA

## 2023-07-21 ENCOUNTER — DOCUMENTATION ONLY (OUTPATIENT)
Dept: ORTHOPEDICS | Facility: CLINIC | Age: 33
End: 2023-07-21
Payer: COMMERCIAL